# Patient Record
Sex: FEMALE
[De-identification: names, ages, dates, MRNs, and addresses within clinical notes are randomized per-mention and may not be internally consistent; named-entity substitution may affect disease eponyms.]

---

## 2019-11-05 PROBLEM — Z00.00 ENCOUNTER FOR PREVENTIVE HEALTH EXAMINATION: Status: ACTIVE | Noted: 2019-11-05

## 2019-11-06 ENCOUNTER — APPOINTMENT (OUTPATIENT)
Dept: ORTHOPEDIC SURGERY | Facility: CLINIC | Age: 64
End: 2019-11-06
Payer: MEDICAID

## 2019-11-06 PROCEDURE — 99203 OFFICE O/P NEW LOW 30 MIN: CPT

## 2019-11-06 RX ORDER — MELOXICAM 15 MG/1
15 TABLET ORAL DAILY
Qty: 30 | Refills: 0 | Status: ACTIVE | COMMUNITY
Start: 2019-11-06 | End: 1900-01-01

## 2019-11-16 NOTE — PHYSICAL EXAM
[Normal] : No respiratory distress and lungs were clear to auscultation bilaterally [de-identified] : NAD [de-identified] : LLE: No gross deformity or effusion noted. Mild TTP about the medial joint line. ROM 0-135 wo pain. Ligaments stable to varus, valgus and Lachman. NVI distally. [de-identified] : XR of the left knee shows mild joint space narrowing of the medial compartment. No fracture or osseous lesions noted.\par \par MRI of the left knee shows a joint effusion and full-thickness radial tear of the posterior horn of the medial meniscus.

## 2019-11-16 NOTE — HISTORY OF PRESENT ILLNESS
[de-identified] : 64 F presenting to clinic today for evaluation of her left knee. She says that she started having severe medial sided knee pain about 6 weeks ago. She denies any specific injury. She says that she had a corticosteroid injection about a month ago along with PT which did not help. She is also using a knee brace which helps some. Tramadol and Ibuprofen help.

## 2019-11-16 NOTE — DISCUSSION/SUMMARY
[de-identified] : - will order HA injection for future visit\par - patient given Rx of mobic for ant inflammatory effect, has Tylenol and Tramadol for additional pain control\par - home PT instructions given\par - no weight bearing restrictions, avoid heavy impact activities\par - follow up when HA injection available\par \par \par All medical record entries made by the PA/Delroy/Fellow are at my, Dr. Rc Ramesh's direction and personally dictated by me on 11/06/2019]. I have reviewed the chart and agree that the record accurately reflects my personal performance of the history, physical exam, assessment, and plan. I have also personally directed reviewed, and agreed with the chart.\par

## 2019-12-04 ENCOUNTER — APPOINTMENT (OUTPATIENT)
Dept: ORTHOPEDIC SURGERY | Facility: CLINIC | Age: 64
End: 2019-12-04
Payer: MEDICAID

## 2019-12-04 PROCEDURE — 20610 DRAIN/INJ JOINT/BURSA W/O US: CPT | Mod: LT

## 2019-12-05 NOTE — PROCEDURE
[de-identified] : After obtaining verbal consent and under the normal sterile conditions the left knee was injected with Hyalgan prefilled syringe.

## 2019-12-05 NOTE — PHYSICAL EXAM
[de-identified] : General: Not in acute distress, dressed appropriately, sitting on examination table\par Skin: Warm and dry, normal turgor, no rashes\par Neurological: AOx3, Cranial nerves grossly in tact\par Psych: Mood and affect appropriate\par \par Left Knee: No swelling edema erythema redness or drainage. tender anteriorly. Alignment: Neutral. ROM: 0-135. Stable. 5/5 Strength. DNVI. Ambulates without devices.\par

## 2019-12-05 NOTE — ASSESSMENT
[FreeTextEntry1] : Assessment/plan\par Left knee arthritis\par \par The left knee was injected as described above, she'll followup next week for injection 2/3.\par \par All medical record entries made by the PA/Delroy/Fellow are at my, Dr. Rc Ramesh's direction and personally dictated by me on 12/04/2019]. I have reviewed the chart and agree that the record accurately reflects my personal performance of the history, physical exam, assessment, and plan. I have also personally directed reviewed, and agreed with the chart.\par

## 2019-12-11 ENCOUNTER — APPOINTMENT (OUTPATIENT)
Dept: ORTHOPEDIC SURGERY | Facility: CLINIC | Age: 64
End: 2019-12-11
Payer: MEDICAID

## 2019-12-11 PROCEDURE — 20610 DRAIN/INJ JOINT/BURSA W/O US: CPT | Mod: LT

## 2019-12-13 NOTE — PROCEDURE
[de-identified] : After obtaining verbal consent and under the normal sterile conditions the left knee was injected with Hyalgan prefilled syringe.

## 2019-12-13 NOTE — ASSESSMENT
[FreeTextEntry1] : Assessment/Plan\par \par Left knee arthritis\par \par The left knee was injected as described above, she'll follow up next week for injection 3/3\par \par All medical record entries made by the PA/Delroy/Fellow are at my, Dr. Rc Ramesh's direction and personally dictated by me on 12/11/2019]. I have reviewed the chart and agree that the record accurately reflects my personal performance of the history, physical exam, assessment, and plan. I have also personally directed reviewed, and agreed with the chart.\par

## 2019-12-13 NOTE — HISTORY OF PRESENT ILLNESS
[de-identified] : Tammie is a 64 year old female here for her second left knee gel injection. No changes since her last visit.

## 2019-12-13 NOTE — PHYSICAL EXAM
[de-identified] : No imaging today [de-identified] : General: Not in acute distress, dressed appropriately, sitting on examination table\par Skin: Warm and dry, normal turgor, no rashes\par Neurological: AOx3, Cranial nerves grossly in tact\par Psych: Mood and affect appropriate\par \par Left Knee: No swelling edema erythema redness or drainage. tender anteriorly. Alignment: Neutral. ROM: 0-135. Stable. 5/5 Strength. DNVI. Ambulates without devices.

## 2019-12-24 ENCOUNTER — APPOINTMENT (OUTPATIENT)
Dept: ORTHOPEDIC SURGERY | Facility: CLINIC | Age: 64
End: 2019-12-24
Payer: MEDICAID

## 2019-12-24 DIAGNOSIS — S83.242A OTHER TEAR OF MEDIAL MENISCUS, CURRENT INJURY, LEFT KNEE, INITIAL ENCOUNTER: ICD-10-CM

## 2019-12-24 PROCEDURE — 20610 DRAIN/INJ JOINT/BURSA W/O US: CPT | Mod: LT

## 2019-12-30 NOTE — PROCEDURE
[de-identified] : \par Tammie is a 64 year old female here for her third left knee gel injection. No changes since her last visit.\par \par \par General: Not in acute distress, dressed appropriately, sitting on examination table\par Skin: Warm and dry, normal turgor, no rashes\par Neurological: AOx3, Cranial nerves grossly in tact\par Psych: Mood and affect appropriate\par \par Left Knee: No swelling edema erythema redness or drainage. tender anteriorly. Alignment: Neutral. ROM: 0-135. Stable. 5/5 Strength. DNVI. Ambulates without devices. \par \par After obtaining verbal consent and under the normal sterile conditions the left knee was injected with Hyalgan prefilled syringe. \par \par \par 3rd left knee Hyalgan injection \par lot#[id number]\par exp: 05/28/2020 \par \par Assessment/Plan\par \par Left knee arthritis\par \par The left knee was injected as described above, she'll follow up in 3 months for CSI if desired or 6 months to repeat course of HA. \par \par All medical record entries made by the PA/Delroy/Fellow are at my, Dr. Rc Ramesh's direction and personally dictated by me on 12/24/2019]. I have reviewed the chart and agree that the record accurately reflects my personal performance of the history, physical exam, assessment, and plan. I have also personally directed reviewed, and agreed with the chart.\par

## 2020-01-22 ENCOUNTER — APPOINTMENT (OUTPATIENT)
Dept: ORTHOPEDIC SURGERY | Facility: CLINIC | Age: 65
End: 2020-01-22
Payer: MEDICAID

## 2020-01-22 DIAGNOSIS — M25.562 PAIN IN LEFT KNEE: ICD-10-CM

## 2020-01-22 PROCEDURE — 99213 OFFICE O/P EST LOW 20 MIN: CPT

## 2020-01-22 RX ORDER — MELOXICAM 7.5 MG/1
7.5 TABLET ORAL DAILY
Qty: 60 | Refills: 0 | Status: ACTIVE | COMMUNITY
Start: 2020-01-22 | End: 1900-01-01

## 2020-01-28 NOTE — PHYSICAL EXAM
[de-identified] : Not in acute distress, dressed appropriately, sitting on examination table \par Skin: Warm and dry, normal turgor, no rashes \par Neurological: AOx3, Cranial nerves grossly in tact \par Psych: Mood and affect appropriate \par Focused exam of the Right Knee: No swelling edema erythema redness or drainage. Non-tender to palpation. Non-tender w/ MCL & LCL examination. Alignment: Neutral. ROM: 0-120. Stable. 5/5 Strength. DNVI. Ambulates without devices.\par \par Focused exam of the Left Knee: No swelling edema erythema redness or drainage. Non-tender to palpation. Non-tender w/ MCL & LCL examination. Alignment: Neutral. ROM: 0-120. Stable. 5/5 Strength. DNVI. Ambulates without devices.\par  [de-identified] : no images today

## 2020-01-28 NOTE — HISTORY OF PRESENT ILLNESS
[de-identified] : 65 yo female here for left knee pain since 1 week ago that is much better since completing her euflexxa 3 series gel injection on 12/24/19, she reports her pain is still and affecting her ability to walk normally\par \par currently on meloxicam prn

## 2020-01-28 NOTE — PROCEDURE
[de-identified] : After obtaining verbal consent and after clearly explaining the risks, benefits, complications, complications, and alternatives. Under the normal sterile conditions the area was sterile prepped and the left knee was injected with a syringe that contains a solution of 1cc of triamcinolone (40 mg/mL) and 4 cc of 1% lidocaine (10 mg/mL)

## 2020-01-28 NOTE — ASSESSMENT
[FreeTextEntry1] : Assessment/Plan: \par Pt has improved pain relief in her left knee after completing her euflexxa gel injections, she has some residual pain in the left knee\par \par Prescribed meloxicam 1-2x per day as needed\par \par Injected corticosteroid injection in the left knee, she needs to F/U in the clinic in 2 months\par \par All medical record entries made by the PA/Gretcheniblynn/Fellow are at my, Dr. Rc Ramesh's direction and personally dictated by me on 01/22/2020]. I have reviewed the chart and agree that the record accurately reflects my personal performance of the history, physical exam, assessment, and plan. I have also personally directed reviewed, and agreed with the chart.\par

## 2024-07-16 ENCOUNTER — APPOINTMENT (OUTPATIENT)
Dept: ORTHOPEDIC SURGERY | Facility: CLINIC | Age: 69
End: 2024-07-16
Payer: MEDICARE

## 2024-07-16 VITALS — WEIGHT: 155 LBS | HEIGHT: 61 IN | BODY MASS INDEX: 29.27 KG/M2

## 2024-07-16 DIAGNOSIS — M75.02 ADHESIVE CAPSULITIS OF LEFT SHOULDER: ICD-10-CM

## 2024-07-16 DIAGNOSIS — M75.42 IMPINGEMENT SYNDROME OF LEFT SHOULDER: ICD-10-CM

## 2024-07-16 DIAGNOSIS — M75.41 IMPINGEMENT SYNDROME OF RIGHT SHOULDER: ICD-10-CM

## 2024-07-16 DIAGNOSIS — M75.01 ADHESIVE CAPSULITIS OF RIGHT SHOULDER: ICD-10-CM

## 2024-07-16 PROCEDURE — 73030 X-RAY EXAM OF SHOULDER: CPT | Mod: 50

## 2024-07-16 PROCEDURE — 20610 DRAIN/INJ JOINT/BURSA W/O US: CPT | Mod: RT

## 2024-07-16 PROCEDURE — 99204 OFFICE O/P NEW MOD 45 MIN: CPT | Mod: 25

## 2024-07-16 RX ORDER — GLUCOSAMINE SULFATE 500 MG
CAPSULE ORAL
Refills: 0 | Status: ACTIVE | COMMUNITY

## 2024-07-16 RX ORDER — CHROMIUM 200 MCG
TABLET ORAL
Refills: 0 | Status: ACTIVE | COMMUNITY

## 2024-07-16 RX ORDER — VITAMIN B COMPLEX
CAPSULE ORAL
Refills: 0 | Status: ACTIVE | COMMUNITY

## 2024-07-17 PROBLEM — M75.41 IMPINGEMENT SYNDROME OF RIGHT SHOULDER: Status: ACTIVE | Noted: 2024-07-17

## 2024-07-17 PROBLEM — M75.42 IMPINGEMENT SYNDROME OF LEFT SHOULDER: Status: ACTIVE | Noted: 2024-07-17

## 2024-09-03 ENCOUNTER — APPOINTMENT (OUTPATIENT)
Dept: ORTHOPEDIC SURGERY | Facility: CLINIC | Age: 69
End: 2024-09-03
Payer: MEDICARE

## 2024-09-03 DIAGNOSIS — M75.42 IMPINGEMENT SYNDROME OF LEFT SHOULDER: ICD-10-CM

## 2024-09-03 DIAGNOSIS — M75.02 ADHESIVE CAPSULITIS OF LEFT SHOULDER: ICD-10-CM

## 2024-09-03 DIAGNOSIS — M75.41 IMPINGEMENT SYNDROME OF RIGHT SHOULDER: ICD-10-CM

## 2024-09-03 DIAGNOSIS — M75.01 ADHESIVE CAPSULITIS OF RIGHT SHOULDER: ICD-10-CM

## 2024-09-03 PROCEDURE — 99212 OFFICE O/P EST SF 10 MIN: CPT | Mod: 25

## 2024-09-03 PROCEDURE — 20610 DRAIN/INJ JOINT/BURSA W/O US: CPT | Mod: LT

## 2025-01-07 ENCOUNTER — APPOINTMENT (OUTPATIENT)
Dept: ORTHOPEDIC SURGERY | Facility: CLINIC | Age: 70
End: 2025-01-07